# Patient Record
Sex: MALE | Race: WHITE
[De-identification: names, ages, dates, MRNs, and addresses within clinical notes are randomized per-mention and may not be internally consistent; named-entity substitution may affect disease eponyms.]

---

## 2018-10-18 PROBLEM — Z00.00 ENCOUNTER FOR PREVENTIVE HEALTH EXAMINATION: Status: ACTIVE | Noted: 2018-10-18

## 2018-10-22 ENCOUNTER — APPOINTMENT (OUTPATIENT)
Dept: ORTHOPEDIC SURGERY | Facility: CLINIC | Age: 68
End: 2018-10-22
Payer: MEDICARE

## 2018-10-22 VITALS — HEIGHT: 72 IN | WEIGHT: 175 LBS | BODY MASS INDEX: 23.7 KG/M2

## 2018-10-22 DIAGNOSIS — M72.0 PALMAR FASCIAL FIBROMATOSIS [DUPUYTREN]: ICD-10-CM

## 2018-10-22 DIAGNOSIS — Z78.9 OTHER SPECIFIED HEALTH STATUS: ICD-10-CM

## 2018-10-22 DIAGNOSIS — F17.200 NICOTINE DEPENDENCE, UNSPECIFIED, UNCOMPLICATED: ICD-10-CM

## 2018-10-22 PROCEDURE — 99203 OFFICE O/P NEW LOW 30 MIN: CPT

## 2019-07-31 DIAGNOSIS — C61 MALIGNANT NEOPLASM OF PROSTATE: ICD-10-CM

## 2024-01-17 ENCOUNTER — APPOINTMENT (OUTPATIENT)
Dept: ORTHOPEDIC SURGERY | Facility: CLINIC | Age: 74
End: 2024-01-17
Payer: MEDICARE

## 2024-01-17 PROCEDURE — 99203 OFFICE O/P NEW LOW 30 MIN: CPT | Mod: 57

## 2024-01-17 PROCEDURE — L3260: CPT | Mod: KX,LT

## 2024-01-17 PROCEDURE — 28470 CLTX METATARSAL FX WO MNP EA: CPT

## 2024-01-17 NOTE — DISCUSSION/SUMMARY
[de-identified] : I reviewed patient's radiographs and discussed his condition and treatment options with patient and his daughter.  I advised immobilization and recommended wearing CAM boot when out of the house.  For short distances and walking at home, I provided him with hard sole shoe today.  Follow up in 2 weeks XRs.  Patient voiced understanding and agreement with the plan.

## 2024-01-17 NOTE — HISTORY OF PRESENT ILLNESS
[de-identified] :  Patient presents for evaluation on LT foot pain. Patient states he had a fall on Sunday at home missing a step going down stairs. Presented to [facility] ER following morning for x-rays, which showed fracture. Patient presents in cam boot. States minimal pain. Patient is taking Tylenol as needed.

## 2024-01-17 NOTE — PHYSICAL EXAM
[Mild] : mild swelling of dorsal foot [5th] : 5th [NL (40)] : plantar flexion 40 degrees [4___] : plantar flexion 4[unfilled]/5 [2+] : posterior tibialis pulse: 2+ [] : patient ambulates without assistive device [Left] : left foot [Outside films reviewed] : Outside films reviewed [FreeTextEntry3] : skin intact out of CAM boot [de-identified] : nondisplaced base of 5th metatarsal fracture [TWNoteComboBox7] : dorsiflexion 5 degrees

## 2024-01-31 ENCOUNTER — APPOINTMENT (OUTPATIENT)
Dept: ORTHOPEDIC SURGERY | Facility: CLINIC | Age: 74
End: 2024-01-31
Payer: MEDICARE

## 2024-01-31 PROCEDURE — 99024 POSTOP FOLLOW-UP VISIT: CPT

## 2024-01-31 PROCEDURE — 73630 X-RAY EXAM OF FOOT: CPT | Mod: LT

## 2024-01-31 NOTE — PHYSICAL EXAM
[Mild] : mild swelling of dorsal foot [5th] : 5th [NL (40)] : plantar flexion 40 degrees [4___] : plantar flexion 4[unfilled]/5 [2+] : posterior tibialis pulse: 2+ [Left] : left foot [] : no calf tenderness [The fracture is in acceptable alignment. There is progression in healing seen] : The fracture is in acceptable alignment. There is progression in healing seen [FreeTextEntry3] : skin intact out of CAM boot [de-identified] : nondisplaced base of 5th metatarsal fracture [TWNoteComboBox7] : dorsiflexion 5 degrees

## 2024-01-31 NOTE — DISCUSSION/SUMMARY
[de-identified] : I reviewed patient's radiographs and discussed his condition and treatment course.  Continue WBAT in CAM boot when out of the house and postop shoe indoors.  Follow up in 4 weeks XRs.  Patient voiced understanding and agreement with the plan.

## 2024-01-31 NOTE — HISTORY OF PRESENT ILLNESS
[de-identified] : Patient is following up on LT foot fx DOI 1/14/24. Patient presents in a CAM walker boot today. Patient states his pain went from a 10 when the injury first happened to almost a 0 unless at night if he twists his foot the wrong way. Patient states he has been wearing the CAM walker boot when he is walking outside and wears the post op shoe while ambulating around the house.

## 2024-02-28 ENCOUNTER — APPOINTMENT (OUTPATIENT)
Dept: ORTHOPEDIC SURGERY | Facility: CLINIC | Age: 74
End: 2024-02-28
Payer: MEDICARE

## 2024-02-28 PROCEDURE — 99024 POSTOP FOLLOW-UP VISIT: CPT

## 2024-02-28 PROCEDURE — 73630 X-RAY EXAM OF FOOT: CPT | Mod: LT

## 2024-02-28 NOTE — HISTORY OF PRESENT ILLNESS
[de-identified] : Patient is following up on LT foot fx DOI 1/14/24. Patient presents in a CAM walker boot today. Patient states that he has no pain and hopes he is healed.

## 2024-02-28 NOTE — DISCUSSION/SUMMARY
[de-identified] : I reviewed patient's radiographs and discussed his condition and treatment course with patient and her daughter.  Discontinue CAM boot and transition to hard sole shoe.  Follow up in 4 weeks XRs.  Patient voiced understanding and agreement with the plan.

## 2024-02-28 NOTE — PHYSICAL EXAM
[5th] : 5th [NL (40)] : plantar flexion 40 degrees [4___] : plantar flexion 4[unfilled]/5 [2+] : posterior tibialis pulse: 2+ [Left] : left foot [The fracture is in acceptable alignment. There is progression in healing seen] : The fracture is in acceptable alignment. There is progression in healing seen [] : no metatarsal tenderness [FreeTextEntry3] : skin intact out of CAM boot [de-identified] : healing nondisplaced base of 5th metatarsal fracture [TWNoteComboBox7] : dorsiflexion 5 degrees

## 2024-03-26 ENCOUNTER — APPOINTMENT (OUTPATIENT)
Dept: ORTHOPEDIC SURGERY | Facility: CLINIC | Age: 74
End: 2024-03-26
Payer: MEDICARE

## 2024-03-26 DIAGNOSIS — S92.355D NONDISPLACED FRACTURE OF FIFTH METATARSAL BONE, LEFT FOOT, SUBSEQUENT ENCOUNTER FOR FRACTURE WITH ROUTINE HEALING: ICD-10-CM

## 2024-03-26 PROCEDURE — 73630 X-RAY EXAM OF FOOT: CPT | Mod: LT

## 2024-03-26 PROCEDURE — 99024 POSTOP FOLLOW-UP VISIT: CPT

## 2024-03-26 NOTE — DISCUSSION/SUMMARY
[de-identified] : I reviewed patient's radiographs and discussed his condition and treatment course.  Resume activities as tolerated.  Follow up as needed.  Patient voiced understanding and agreement with the plan.

## 2024-03-26 NOTE — PHYSICAL EXAM
[5th] : 5th [NL (40)] : plantar flexion 40 degrees [2+] : posterior tibialis pulse: 2+ [Left] : left foot [The fracture is in acceptable alignment. There is progression in healing seen] : The fracture is in acceptable alignment. There is progression in healing seen [] : no metatarsal tenderness [5___] : plantar flexion 5[unfilled]/5 [FreeTextEntry3] : skin intact out of regular shoe [de-identified] : healed nondisplaced base of 5th metatarsal fracture [TWNoteComboBox7] : dorsiflexion 5 degrees

## 2024-03-26 NOTE — HISTORY OF PRESENT ILLNESS
[de-identified] : Patient is following up on LT foot fx DOI 1/14/24. Patient presents in regular shoe today in office and has been wearing them for about a week. Patient states that he has no pain and hopes he is healed.